# Patient Record
Sex: FEMALE | Race: OTHER
[De-identification: names, ages, dates, MRNs, and addresses within clinical notes are randomized per-mention and may not be internally consistent; named-entity substitution may affect disease eponyms.]

---

## 2017-04-20 ENCOUNTER — HOSPITAL ENCOUNTER (EMERGENCY)
Dept: HOSPITAL 61 - ER | Age: 27
Discharge: HOME | End: 2017-04-20
Payer: SELF-PAY

## 2017-04-20 VITALS — SYSTOLIC BLOOD PRESSURE: 118 MMHG | DIASTOLIC BLOOD PRESSURE: 66 MMHG

## 2017-04-20 DIAGNOSIS — R82.71: ICD-10-CM

## 2017-04-20 DIAGNOSIS — R10.30: ICD-10-CM

## 2017-04-20 DIAGNOSIS — Z3A.17: ICD-10-CM

## 2017-04-20 DIAGNOSIS — O26.892: Primary | ICD-10-CM

## 2017-04-20 DIAGNOSIS — O36.0920: ICD-10-CM

## 2017-04-20 LAB
ALBUMIN SERPL-MCNC: 3.2 G/DL (ref 3.4–5)
ALP SERPL-CCNC: 42 U/L (ref 46–116)
ALT SERPL-CCNC: 16 U/L (ref 14–59)
ANION GAP SERPL CALC-SCNC: 10 MMOL/L (ref 6–14)
ANISOCYTOSIS BLD QL SMEAR: SLIGHT
AST SERPL-CCNC: 15 U/L (ref 15–37)
BACTERIA #/AREA URNS HPF: (no result) /HPF
BASOPHILS # BLD AUTO: 0 X10^3/UL (ref 0–0.2)
BASOPHILS NFR BLD: 0 % (ref 0–3)
BILIRUB DIRECT SERPL-MCNC: 0.1 MG/DL (ref 0–0.2)
BILIRUB SERPL-MCNC: 0.2 MG/DL (ref 0.2–1)
BILIRUB UR QL STRIP: NEGATIVE
BUN SERPL-MCNC: 10 MG/DL (ref 7–20)
CALCIUM SERPL-MCNC: 8.8 MG/DL (ref 8.5–10.1)
CHLORIDE SERPL-SCNC: 102 MMOL/L (ref 98–107)
CO2 SERPL-SCNC: 26 MMOL/L (ref 21–32)
CREAT SERPL-MCNC: 0.6 MG/DL (ref 0.6–1)
EOSINOPHIL NFR BLD: 1 % (ref 0–3)
ERYTHROCYTE [DISTWIDTH] IN BLOOD BY AUTOMATED COUNT: 18 % (ref 11.5–14.5)
GFR SERPLBLD BASED ON 1.73 SQ M-ARVRAT: 121.8 ML/MIN
GLUCOSE SERPL-MCNC: 100 MG/DL (ref 70–99)
GLUCOSE UR STRIP-MCNC: NEGATIVE MG/DL
HCT VFR BLD CALC: 27.2 % (ref 36–47)
HGB BLD-MCNC: 8.8 G/DL (ref 12–15.5)
HYPOCHROMIA BLD QL SMEAR: SLIGHT
LYMPHOCYTES # BLD: 1.9 X10^3/UL (ref 1–4.8)
LYMPHOCYTES NFR BLD AUTO: 20 % (ref 24–48)
MCH RBC QN AUTO: 23 PG (ref 25–35)
MCHC RBC AUTO-ENTMCNC: 32 G/DL (ref 31–37)
MCV RBC AUTO: 72 FL (ref 79–100)
MICROCYTES BLD QL SMEAR: SLIGHT
MONOCYTES NFR BLD: 6 % (ref 0–9)
NEUTROPHILS NFR BLD AUTO: 73 % (ref 31–73)
NITRITE UR QL STRIP: NEGATIVE
PH UR STRIP: 7 [PH]
PLATELET # BLD AUTO: 191 X10^3/UL (ref 140–400)
PLATELET # BLD EST: ADEQUATE 10*3/UL
POIKILOCYTOSIS BLD QL SMEAR: SLIGHT
POTASSIUM SERPL-SCNC: 3.3 MMOL/L (ref 3.5–5.1)
PROT SERPL-MCNC: 7.4 G/DL (ref 6.4–8.2)
PROT UR STRIP-MCNC: NEGATIVE MG/DL
RBC # BLD AUTO: 3.8 X10^6/UL (ref 3.5–5.4)
RBC #/AREA URNS HPF: 0 /HPF (ref 0–2)
SODIUM SERPL-SCNC: 138 MMOL/L (ref 136–145)
SP GR UR STRIP: 1.01
SQUAMOUS #/AREA URNS LPF: (no result) /LPF
UROBILINOGEN UR-MCNC: 1 MG/DL
WBC # BLD AUTO: 9.3 X10^3/UL (ref 4–11)
WBC #/AREA URNS HPF: (no result) /HPF (ref 0–4)

## 2017-04-20 PROCEDURE — 80076 HEPATIC FUNCTION PANEL: CPT

## 2017-04-20 PROCEDURE — 76805 OB US >/= 14 WKS SNGL FETUS: CPT

## 2017-04-20 PROCEDURE — 36415 COLL VENOUS BLD VENIPUNCTURE: CPT

## 2017-04-20 PROCEDURE — 80048 BASIC METABOLIC PNL TOTAL CA: CPT

## 2017-04-20 PROCEDURE — 86850 RBC ANTIBODY SCREEN: CPT

## 2017-04-20 PROCEDURE — 85027 COMPLETE CBC AUTOMATED: CPT

## 2017-04-20 PROCEDURE — 81001 URINALYSIS AUTO W/SCOPE: CPT

## 2017-04-20 PROCEDURE — 86900 BLOOD TYPING SEROLOGIC ABO: CPT

## 2017-04-20 PROCEDURE — 86901 BLOOD TYPING SEROLOGIC RH(D): CPT

## 2017-04-20 PROCEDURE — 85007 BL SMEAR W/DIFF WBC COUNT: CPT

## 2017-04-20 PROCEDURE — 83690 ASSAY OF LIPASE: CPT

## 2017-04-20 NOTE — PHYS DOC
Past Medical History


Past Medical History:  No Pertinent History


Past Surgical History:  No Surgical History


Additional Past Surgical Histo:  OVARIAN TORSION


Alcohol Use:  None


Drug Use:  None





Adult General


Chief Complaint


Chief Complaint:  ABDOMINAL PAIN IN PREGNANCY





HPI


HPI


27-year-old female presenting to the emergency department today for concern for 

the fetus. She is a proximally 17 weeks pregnant by last menstrual period. She 

has had lower abdominal pain that is sharp moderate intermittent nonradiating. 

She was seen in clinic today with her unable to find the fetal heartbeat. She 

denies trauma. She denies vaginal bleeding.





Review of systems is negative for chest pain shortness of breath headache neck 

stiffness fevers or chills. All other review of systems is negative unless 

otherwise noted in history of present illness.





Review of Systems


Review of Systems


SEE ABOVE.





Allergies


Allergies





 Allergies








Coded Allergies Type Severity Reaction Last Updated Verified


 


  No Known Drug Allergies    1/10/16 No











Physical Exam


Physical Exam





Constitutional: Well developed, well nourished, no acute distress, non-toxic 

appearance. 


HENT: Normocephalic, atraumatic, bilateral external ears normal, oropharynx 

moist, no oral exudates, nose normal. []


Eyes: PERRLA, EOMI, conjunctiva normal, no discharge.  


Neck: Normal range of motion, no tenderness, supple, no stridor. [] 


Cardiovascular:Heart rate regular rhythm, no murmur 


Lungs & Thorax:  Bilateral breath sounds clear to auscultation []


Abdomen: Gravid abdomen that is nontender. No rebound tenderness or guarding is 

present. Negative McBurney's point when adjusted for gestation. 


Skin: Warm, dry, no erythema, no rash. [] 


Back: No tenderness, no CVA tenderness.  


Extremities: No tenderness, no cyanosis, no clubbing, ROM intact, no edema. [] 


Neurologic: Alert and oriented X 3, normal motor function, normal sensory 

function, no focal deficits noted. 


Psychologic: Affect normal, judgement normal, mood normal.





Current Patient Data


Vital Signs





 Vital Signs








  Date Time  Temp Pulse Resp B/P Pulse Ox O2 Delivery O2 Flow Rate FiO2


 


4/20/17 20:12 97.9 71 16 118/66 100 Room Air  





 97.9       








Lab Values





 Laboratory Tests








Test


  4/20/17


20:28 4/20/17


21:08


 


Urine Collection Type Unknown   


 


Urine Color Yellow   


 


Urine Clarity Clear   


 


Urine pH 7.0   


 


Urine Specific Gravity 1.015   


 


Urine Protein


  Negativemg/dL


(NEG-TRACE) 


 


 


Urine Glucose (UA)


  Negativemg/dL


(NEG) 


 


 


Urine Ketones (Stick)


  Negativemg/dL


(NEG) 


 


 


Urine Blood


  Negative (NEG)


  


 


 


Urine Nitrite


  Negative (NEG)


  


 


 


Urine Bilirubin


  Negative (NEG)


  


 


 


Urine Urobilinogen Dipstick


  1.0mg/dL (0.2


mg/dL) 


 


 


Urine Leukocyte Esterase


  Negative (NEG)


  


 


 


Urine RBC 0/HPF (0-2)   


 


Urine WBC Occ/HPF (0-4)   


 


Urine Squamous Epithelial


Cells Mod/LPF  


  


 


 


Urine Bacteria


  Few/HPF


(0-FEW) 


 


 


Urine Mucus Slight/LPF   


 


White Blood Count


  


  9.3x10^3/uL


(4.0-11.0)


 


Red Blood Count


  


  3.80x10^6/uL


(3.50-5.40)


 


Hemoglobin


  


  8.8g/dL


(12.0-15.5)  L


 


Hematocrit


  


  27.2%


(36.0-47.0)  L


 


Mean Corpuscular Volume


  


  72fL ()


L


 


Mean Corpuscular Hemoglobin  23pg (25-35)  L


 


Mean Corpuscular Hemoglobin


Concent 


  32g/dL (31-37)


 


 


Red Cell Distribution Width


  


  18.0%


(11.5-14.5)  H


 


Platelet Count


  


  191x10^3/uL


(140-400)


 


Neutrophils (%) (Auto)  73% (31-73)  


 


Lymphocytes (%) (Auto)  20% (24-48)  L


 


Monocytes (%) (Auto)  6% (0-9)  


 


Eosinophils (%) (Auto)  1% (0-3)  


 


Basophils (%) (Auto)  0% (0-3)  


 


Neutrophils # (Auto)


  


  6.8x10^3uL


(1.8-7.7)


 


Lymphocytes # (Auto)


  


  1.9x10^3/uL


(1.0-4.8)


 


Monocytes # (Auto)


  


  0.6x10^3/uL


(0.0-1.1)


 


Eosinophils # (Auto)


  


  0.0x10^3/uL


(0.0-0.7)


 


Basophils # (Auto)


  


  0.0x10^3/uL


(0.0-0.2)


 


Platelet Estimate  Pending  


 


Sodium Level


  


  138mmol/L


(136-145)


 


Potassium Level


  


  3.3mmol/L


(3.5-5.1)  L


 


Chloride Level


  


  102mmol/L


()


 


Carbon Dioxide Level


  


  26mmol/L


(21-32)


 


Anion Gap  10 (6-14)  


 


Blood Urea Nitrogen


  


  10mg/dL (7-20)


 


 


Creatinine


  


  0.6mg/dL


(0.6-1.0)


 


Estimated GFR


(Cockcroft-Gault) 


  121.8  


 


 


Glucose Level


  


  100mg/dL


(70-99)  H


 


Calcium Level


  


  8.8mg/dL


(8.5-10.1)


 


Total Bilirubin


  


  0.2mg/dL


(0.2-1.0)


 


Direct Bilirubin


  


  0.1mg/dL


(0.0-0.2)


 


Aspartate Amino Transferase


(AST) 


  15U/L (15-37)  


 


 


Alanine Aminotransferase (ALT)  16U/L (14-59)  


 


Alkaline Phosphatase


  


  42U/L ()


L


 


Total Protein


  


  7.4g/dL


(6.4-8.2)


 


Albumin


  


  3.2g/dL


(3.4-5.0)  L


 


Lipase


  


  112U/L


()





 Laboratory Tests


4/20/17 21:08








 Laboratory Tests


4/20/17 21:08











EKG


EKG


[]





Radiology/Procedures


Radiology/Procedures


[]





Course & Med Decision Making


Course & Med Decision Making


Pertinent Labs and Imaging studies reviewed. (See chart for details)





[] 27-year-old female presenting to the emergency department with abdominal 

pain and reported difficulty finding fetal heartbeat in clinic. Vital signs 

unremarkable. Pertinent physical exam findings showed a nontender abdomen. 

Ultrasound showed fetus with heart beat. No vaginal bleeding present. Patient 

was in discharged home to follow up with obstetrician over the next 2-3 days. 

Repeat ultrasound recommended within 2 weeks. pt is Rh neg. No need for rhogam 

to be given at this point because the patient does not have vaginal bleeding or 

recent trauma. She was given Keflex for asymptomatic bacteriuria in pregnancy.





Dragon Disclaimer


Dragon Disclaimer


This electronic medical record was generated, in whole or in part, using a 

voice recognition dictation system.





Departure


Departure


Impression:  


 Primary Impression:  


 Abdominal pain affecting pregnancy


 Additional Impressions:  


 Asymptomatic bacteriuria


 Rh negative status during pregnancy


Disposition:  01 HOME, SELF-CARE


Condition:  STABLE


Referrals:  


NO PCP (PCP)








MONICA BUSH Jr, MD


Patient Instructions:  Abdominal Pain During Pregnancy





Additional Instructions:


Thank you for allowing us to participate in your care today.





Followup with your OB in 3 days if your symptoms do not improve. If you do not 

have a primary care provider you can ask for a list of our primary care 

providers. Return to the emergency department you have any new or concerning 

findings.





This should be evaluated by the primary care physician and any necessary 

consulting services for continued management within a few days after discharge. 

Return to emergency room if you have any  new or concerning symptoms including 

but not limited to fever, chills, nausea, vomiting, intractable pain, any new 

rashes, chest pain, shortness of air, uncontrolled bleeding, difficulty 

breathing, and/or vision loss.





You may have been prescribed medication that can change in your level of 

thinking and ability to operate machinery. These medications include 

hydrocodone and Ativan. Also, Benadryl has been known to do this as well. Be 

sure to check with your pharmacist and ask if the medications you've prescribed 

can affect your level of consciousness. I recommend not operating heavy 

machinery or driving while on medication such as these.


Scripts


Cephalexin (Keflex)500 Mg Capsule1 Cap PO BID #14 CAP


   Prov:HAIDER DEVINE MD         4/20/17


Prenatal Vits W-Ca,Fe,Fa(<1MG) (Prenatal Vitamins)1 Each Tablet1 Tab PO DAILY #

15 TAB


   Prov:HAIDER DEVINE MD         4/20/17





Problem Qualifiers








HAIDER DEVINE MD Apr 20, 2017 20:53

## 2017-04-20 NOTE — RAD
PROCEDURE 

Obstetrics sonogram. 

 

HISTORY 

Pain. 

 

TECHNIQUE 

Sonographic imaging of a gravid uterus was performed. 

 

COMPARISON 

None. 

 

FINDINGS 

There is a single intrauterine fetus with a heart rate of 141 beats per 

minute. The fetus is in cephalic presentation. The cervix measures 5.4 cm 

in length. There is a low lying posterior placenta extending to 1.9 cm 

from the internal cervical os. The amniotic fluid index is normal. There 

is a three-vessel umbilical cord. Fetal body motion is seen. The fetal 

bladder, stomach, kidneys, spine and brain are unremarkable. There is a 

four-chamber fetal heart. 

 

The biparietal diameter is 3.67 cm, corresponding with 17 weeks and 2 

days. The head circumference is 13.45 cm, corresponding with 17 weeks and 

0 days. The abdominal circumference is 11.39 cm, corresponding with 17 

weeks and 1 day. The femoral length is 2.48 cm, corresponding with 17 

weeks and 3 days. The head circumference to abdominal circumference ratio 

is 1.18. The estimated fetal weight is 189 grams in the estimated 

gestational age based on combined ultrasound measurements is 17 weeks and 

2 days. 

 

There prominent right maternal adnexal vessels, not clearly within limits 

to suggest congestion. The placental insertion of the umbilical cord is 

less than 1 centimeter from the placental margin. The cephalic index is 

slightly elevated 85.1. There is a small hypoechoic region within the 

fundus of the uterus possibly due to a small fibroid. 

 

IMPRESSION 

1. Single intrauterine fetus with an estimated gestational age based on 

ultrasound measurements of 17 weeks and 2 days and heart rate of 141 beats

per minute. Fetal anatomy was assessed on this exam. However, a formal 

fetal anatomy survey can be performed at approximately 20 weeks gestation.

 

2. Suspected marginal umbilical cord insertion extending to less than 1 

centimeter from the placental margin. Short-term follow-up is recommended.

 

3. Prominent right maternal adnexal vessels, not clearly within limits to 

suggest congestion. 

 

Electronically signed by: Debra Iyer (Apr 20, 2017 21:50:50)

## 2021-08-29 ENCOUNTER — HOSPITAL ENCOUNTER (EMERGENCY)
Dept: HOSPITAL 61 - ER | Age: 31
Discharge: HOME | End: 2021-08-29
Payer: SELF-PAY

## 2021-08-29 VITALS — SYSTOLIC BLOOD PRESSURE: 116 MMHG | DIASTOLIC BLOOD PRESSURE: 70 MMHG

## 2021-08-29 VITALS — HEIGHT: 65 IN | WEIGHT: 141.1 LBS | BODY MASS INDEX: 23.51 KG/M2

## 2021-08-29 DIAGNOSIS — U07.1: Primary | ICD-10-CM

## 2021-08-29 LAB
ALBUMIN SERPL-MCNC: 4.3 G/DL (ref 3.4–5)
ALBUMIN/GLOB SERPL: 1.2 {RATIO} (ref 1–1.7)
ALP SERPL-CCNC: 57 U/L (ref 46–116)
ALT SERPL-CCNC: 16 U/L (ref 14–59)
ANION GAP SERPL CALC-SCNC: 9 MMOL/L (ref 6–14)
ANISOCYTOSIS BLD QL SMEAR: SLIGHT
APTT PPP: YELLOW S
AST SERPL-CCNC: 18 U/L (ref 15–37)
BACTERIA #/AREA URNS HPF: (no result) /HPF
BASOPHILS # BLD AUTO: 0 X10^3/UL (ref 0–0.2)
BASOPHILS NFR BLD: 1 % (ref 0–3)
BILIRUB SERPL-MCNC: 0.2 MG/DL (ref 0.2–1)
BILIRUB UR QL STRIP: NEGATIVE
BUN SERPL-MCNC: 13 MG/DL (ref 7–20)
BUN/CREAT SERPL: 16 (ref 6–20)
CALCIUM SERPL-MCNC: 9.1 MG/DL (ref 8.5–10.1)
CHLORIDE SERPL-SCNC: 103 MMOL/L (ref 98–107)
CO2 SERPL-SCNC: 29 MMOL/L (ref 21–32)
CREAT SERPL-MCNC: 0.8 MG/DL (ref 0.6–1)
EOSINOPHIL NFR BLD: 0.1 X10^3/UL (ref 0–0.7)
EOSINOPHIL NFR BLD: 2 % (ref 0–3)
ERYTHROCYTE [DISTWIDTH] IN BLOOD BY AUTOMATED COUNT: 18.2 % (ref 11.5–14.5)
FIBRINOGEN PPP-MCNC: CLEAR MG/DL
GFR SERPLBLD BASED ON 1.73 SQ M-ARVRAT: 84.8 ML/MIN
GLUCOSE SERPL-MCNC: 81 MG/DL (ref 70–99)
HCT VFR BLD CALC: 30 % (ref 36–47)
HGB BLD-MCNC: 9.4 G/DL (ref 12–15.5)
INFLUENZA A PATIENT: NEGATIVE
INFLUENZA B PATIENT: NEGATIVE
LYMPHOCYTES # BLD: 1.7 X10^3/UL (ref 1–4.8)
LYMPHOCYTES NFR BLD AUTO: 43 % (ref 24–48)
MCH RBC QN AUTO: 22 PG (ref 25–35)
MCHC RBC AUTO-ENTMCNC: 31 G/DL (ref 31–37)
MCV RBC AUTO: 70 FL (ref 79–100)
MICROCYTES BLD QL SMEAR: (no result)
MONO #: 0.3 X10^3/UL (ref 0–1.1)
MONOCYTES NFR BLD: 7 % (ref 0–9)
MONONUCLEOSIS PATIENT: NEGATIVE
NEUT #: 1.9 X10^3/UL (ref 1.8–7.7)
NEUTROPHILS NFR BLD AUTO: 47 % (ref 31–73)
NITRITE UR QL STRIP: NEGATIVE
PH UR STRIP: 6.5 [PH]
PLATELET # BLD AUTO: 281 X10^3/UL (ref 140–400)
PLATELET # BLD EST: ADEQUATE 10*3/UL
POLYCHROMASIA BLD QL SMEAR: SLIGHT
POTASSIUM SERPL-SCNC: 3.7 MMOL/L (ref 3.5–5.1)
PROT SERPL-MCNC: 7.9 G/DL (ref 6.4–8.2)
PROT UR STRIP-MCNC: NEGATIVE MG/DL
RBC # BLD AUTO: 4.28 X10^6/UL (ref 3.5–5.4)
RBC #/AREA URNS HPF: 0 /HPF (ref 0–2)
SODIUM SERPL-SCNC: 141 MMOL/L (ref 136–145)
UROBILINOGEN UR-MCNC: 0.2 MG/DL
WBC # BLD AUTO: 4 X10^3/UL (ref 4–11)
WBC #/AREA URNS HPF: (no result) /HPF (ref 0–4)

## 2021-08-29 PROCEDURE — 80053 COMPREHEN METABOLIC PANEL: CPT

## 2021-08-29 PROCEDURE — 99284 EMERGENCY DEPT VISIT MOD MDM: CPT

## 2021-08-29 PROCEDURE — 81025 URINE PREGNANCY TEST: CPT

## 2021-08-29 PROCEDURE — 87426 SARSCOV CORONAVIRUS AG IA: CPT

## 2021-08-29 PROCEDURE — 36415 COLL VENOUS BLD VENIPUNCTURE: CPT

## 2021-08-29 PROCEDURE — 81001 URINALYSIS AUTO W/SCOPE: CPT

## 2021-08-29 PROCEDURE — 87804 INFLUENZA ASSAY W/OPTIC: CPT

## 2021-08-29 PROCEDURE — 84443 ASSAY THYROID STIM HORMONE: CPT

## 2021-08-29 PROCEDURE — 86308 HETEROPHILE ANTIBODY SCREEN: CPT

## 2021-08-29 PROCEDURE — 85025 COMPLETE CBC W/AUTO DIFF WBC: CPT

## 2021-08-29 PROCEDURE — 71045 X-RAY EXAM CHEST 1 VIEW: CPT

## 2021-08-29 NOTE — ED.ADGEN
Past Medical History


Past Medical History:  No Pertinent History


Past Surgical History:  No Surgical History


Additional Past Surgical Histo:  OVARIAN TORSION


Smoking Status:  Never Smoker


Alcohol Use:  None


Drug Use:  None





General Adult


EDM:


Chief Complaint:  FLU SYMPTOM





HPI:


HPI:





Patient is a 31  year old female coming in with multiple complaints.  Patient 

states she has bilateral posterior neck pain but no C-spine tenderness.  No neck

rigidity or sore throat.  Also complaining of bilateral low back pain that she 

describes as cramping.  Denies any dysuria or hematuria.  Denies any fevers but 

has had chills.  Has had a headache and nonproductive cough.  Patient states she

got her second Covid vaccine 1 month ago.  No known sick contacts.  Works as a 

 and denies any heavy lifting or falls.  Patient states she was told that

she needs to see an oncologist about her thyroid but has not followed up, it was

several years ago.  States she thinks her thyroid is overactive but is unsure of

what the exact problem list.





Review of Systems:


Review of Systems:


All other systems within normal limits except for as noted in the HPI





Allergies:


Allergies:





Allergies








Coded Allergies Type Severity Reaction Last Updated Verified


 


  No Known Drug Allergies    1/10/16 No











Physical Exam:


PE:


Constitutional: Well developed, well nourished, no acute distress, non-toxic 

appearance. []


HENT: Normocephalic, atraumatic, bilateral external ears normal,  nose normal.  

Normal oropharynx []


Eyes: PERRLA, conjunctiva normal, no discharge. [] 


Neck: No rigidity, supple, no stridor.  No C-spine tenderness, tenderness on 

bilateral paraspinous muscles.  No lymphadenopathy felt but tender over 

posterior cervical chain [] 


Cardiovascular: Regular rate and rhythm, brisk cap refill []


Lungs & Thorax: Non labored symmetric respirations, no tachypnea or respiratory 

distress []


Abdomen: Soft, nondistended.


Skin: Warm, dry, no erythema, no rash. [] 


Back: Unremarkable, bilateral lumbar tenderness, no tenderness on the spine, no 

step-off or deformity


Extremities: No deformities, range of motion grossly intact, no lower extremity 

edema [] 


Neurologic: Alert and oriented X 3, no focal deficits noted. []


Psychologic: Affect normal, judgement normal, mood normal. []





Current Patient Data:


Labs:





                                Laboratory Tests








Test


 21


20:30 21


20:44 21


21:15


 


Urine Collection Type Unknown    


 


Urine Color Yellow    


 


Urine Clarity Clear    


 


Urine pH


 6.5 (<5.0-8.0)


 


 





 


Urine Specific Gravity


 1.020


(1.000-1.030) 


 





 


Urine Protein


 Negative mg/dL


(NEG-TRACE) 


 





 


Urine Glucose (UA)


 Negative mg/dL


(NEG) 


 





 


Urine Ketones (Stick)


 Negative mg/dL


(NEG) 


 





 


Urine Blood


 Negative (NEG)


 


 





 


Urine Nitrite


 Negative (NEG)


 


 





 


Urine Bilirubin


 Negative (NEG)


 


 





 


Urine Urobilinogen Dipstick


 0.2 mg/dL (0.2


mg/dL) 


 





 


Urine Leukocyte Esterase


 Negative (NEG)


 


 





 


Urine RBC 0 /HPF (0-2)    


 


Urine WBC


 Rare /HPF


(0-4) 


 





 


Urine Squamous Epithelial


Cells Many /LPF  


 


 





 


Urine Bacteria


 Few /HPF


(0-FEW) 


 





 


Urine Mucus Marked /LPF    


 


POC Urine HCG, Qualitative


 


 Hcg negative


(Negative) 





 


White Blood Count


 


 


 4.0 x10^3/uL


(4.0-11.0)


 


Red Blood Count


 


 


 4.28 x10^6/uL


(3.50-5.40)


 


Hemoglobin


 


 


 9.4 g/dL


(12.0-15.5)  L


 


Hematocrit


 


 


 30.0 %


(36.0-47.0)  L


 


Mean Corpuscular Volume


 


 


 70 fL ()


L


 


Mean Corpuscular Hemoglobin


 


 


 22 pg (25-35)


L


 


Mean Corpuscular Hemoglobin


Concent 


 


 31 g/dL


(31-37)


 


Red Cell Distribution Width


 


 


 18.2 %


(11.5-14.5)  H


 


Platelet Count


 


 


 281 x10^3/uL


(140-400)


 


Neutrophils (%) (Auto)   47 % (31-73)  


 


Lymphocytes (%) (Auto)   43 % (24-48)  


 


Monocytes (%) (Auto)   7 % (0-9)  


 


Eosinophils (%) (Auto)   2 % (0-3)  


 


Basophils (%) (Auto)   1 % (0-3)  


 


Neutrophils # (Auto)


 


 


 1.9 x10^3/uL


(1.8-7.7)


 


Lymphocytes # (Auto)


 


 


 1.7 x10^3/uL


(1.0-4.8)


 


Monocytes # (Auto)


 


 


 0.3 x10^3/uL


(0.0-1.1)


 


Eosinophils # (Auto)


 


 


 0.1 x10^3/uL


(0.0-0.7)


 


Basophils # (Auto)


 


 


 0.0 x10^3/uL


(0.0-0.2)


 


Platelet Estimate


 


 


 Adequate


(ADEQUATE)


 


Polychromasia   Slight  


 


Anisocytosis   Slight  


 


Microcytosis   Mod  


 


Sodium Level


 


 


 141 mmol/L


(136-145)


 


Potassium Level


 


 


 3.7 mmol/L


(3.5-5.1)


 


Chloride Level


 


 


 103 mmol/L


()


 


Carbon Dioxide Level


 


 


 29 mmol/L


(21-32)


 


Anion Gap   9 (6-14)  


 


Blood Urea Nitrogen


 


 


 13 mg/dL


(7-20)


 


Creatinine


 


 


 0.8 mg/dL


(0.6-1.0)


 


Estimated GFR


(Cockcroft-Gault) 


 


 84.8  





 


BUN/Creatinine Ratio   16 (6-20)  


 


Glucose Level


 


 


 81 mg/dL


(70-99)


 


Calcium Level


 


 


 9.1 mg/dL


(8.5-10.1)


 


Total Bilirubin


 


 


 0.2 mg/dL


(0.2-1.0)


 


Aspartate Amino Transferase


(AST) 


 


 18 U/L (15-37)





 


Alanine Aminotransferase (ALT)


 


 


 16 U/L (14-59)





 


Alkaline Phosphatase


 


 


 57 U/L


()


 


Total Protein


 


 


 7.9 g/dL


(6.4-8.2)


 


Albumin


 


 


 4.3 g/dL


(3.4-5.0)


 


Albumin/Globulin Ratio   1.2 (1.0-1.7)  


 


Heterophil Agglutinins


 


 


 Negative


(NEGATIVE)


 


Influenza Type A Antigen


 


 


 Negative


(NEGATIVE)


 


Influenza Type B Antigen


 


 


 Negative


(NEGATIVE)


 


SARS-CoV-2 Antigen (Rapid)


 


 


 Positive


(NEGATIVE)  *A





                                Laboratory Tests


21 21:15








                                Laboratory Tests


21 21:15








Vital Signs:





                                   Vital Signs








  Date Time  Temp Pulse Resp B/P (MAP) Pulse Ox O2 Delivery O2 Flow Rate FiO2


 


21 20:34 97.8 68 24 125/79 (83) 100 Room Air  





 97.8       











EKG:


EKG:


[]





Heart Score:


C/O Chest Pain:  No


Risk Factors:


Risk Factors:  DM, Current or recent (<one month) smoker, HTN, HLP, family 

history of CAD, obesity.


Risk Scores:


Score 0 - 3:  2.5% MACE over next 6 weeks - Discharge Home


Score 4 - 6:  20.3% MACE over next 6 weeks - Admit for Clinical Observation


Score 7 - 10:  72.7% MACE over next 6 weeks - Early Invasive Strategies





Radiology/Procedures:


Radiology/Procedures:


[facility]


                    [address] [place] [address]


                                 [phone]


                                        


                                 IMAGING REPORT





                                     Signed





PATIENT: JOSE LAWSON  ACCOUNT: [id number]     MRN#: [id number]


: 1990           LOCATION: ER              AGE: 31


SEX: F                    EXAM DT: 21         ACCESSION#: [id number]


STATUS: REG ER            ORD. PHYSICIAN: ANTHONY MCLEAN MD


REASON: cough


PROCEDURE: PORTABLE CHEST 1V





Single view chest dated 2021 9:10 PM:





COMPARISON: None





Clinical Indication: Cough.





Findings:





Single upright portable exam of the chest was performed. Heart size and 

mediastinal contours are within normal limits. Lungs are clear. No consolidation

 or pleural effusion. No pneumothorax.





IMPRESSION:





No acute radiographic abnormality.





Electronically signed by: Ric Johnson MD (2021 9:10 PM) [facility] [place]














DICTATED and SIGNED BY:     RIC JOHNSON MD


DATE:     210MTH0 0


[]





Course & Med Decision Making:


Course & Med Decision Making


Pertinent Labs and Imaging studies reviewed. (See chart for details)





[]





Dragon Disclaimer:


Dragon Disclaimer:


This electronic medical record was generated, in whole or in part, using a voice

 recognition dictation system.





Departure


Departure


Impression:  


   Primary Impression:  


   COVID-19


Disposition:   HOME / SELF CARE / HOMELESS


Condition:  STABLE


Referrals:  


NO PCP (PCP)





Additional Instructions:  


You have been tested for or diagnosed with COVID-19. It is an infection caused 

by a new type 


of coronavirus. COVID-19 will cause cold-like or mild flu symptoms in most. It 

can cause 


more severe symptoms like problems breathing in some.





There is no treatment for COVID-19. The body will clear the infection over time.

 Self-care 


will help to ease discomfort.





Steps to Take:


Self-Care


Rest as needed. Healthy habits may help you feel better. Steps include:





Choose healthy foods including fruits and vegetables. Drink water throughout the

 day.


Get plenty of sleep each night.


If you smoke, try to quit. It may ease breathing.


Avoid alcohol.


Keep Others Healthy


The virus can spread to others. Droplets are released every time you sneeze or 

cough. The 


droplets can get into the mouth, nose, or eyes of people near you and lead to 

infection. To 


lower the chances of spreading COVID-19 to others:





Stay at home until your doctor has said it is safe to leave. If you tested 

positive this 


will mean staying isolated until both of the following are true:





At least 7 days have passed since the start of illness.


You are free of fever for at least 72 hours without the use of medicine.


During this time:





 - Avoid public areas, events, or transportation. Do not return to work or 

school until your 


doctor has said it is safe to do so.


 - Call ahead if you need to go to a medical center. Let them know you may have 

COVID-19. It 


will help them guide you where to go. They may also ask you to wear a facemask 

when you come 


to the office.


 - If you call for emergency medical services, let them know you may have COVID-

19.


While at home:





 - Try to avoid close contact with others. Stay about 6 feet away.


 - If possible, spend most of your time in a separate room from others.


 - Use a face mask if you will be in close contact with others such as sharing a

 room or 


vehicle.


 - Have someone wipe down common surfaces in the home. Use household  

every day on 


areas like doorknobs, counters, or sinks.


 - Cough or sneeze into a tissue. Throw the tissue away right after use. If a t

issue is not 


available, cough or sneeze into your elbow.


 - Wash your hands often. Wash them after sneezing or coughing. Use soap and 

water and wash 


for at least 20 seconds. Alcohol based hand  can be used if soap and 

water is not 


available.


 - Do not prepare food for others. Avoid sharing personal items like forks, 

spoons, or 


toothbrushes.


 - Avoid close contact with pets while you are sick. There is no evidence of the

 virus 


passing to pets. This is a safety step until more is known about this virus.


Isolation can be frustrating. Social interaction can help. Keep in touch with 

friends and 


family through phone and tech options. You can still interact with others in 

your home, just 


keep a safe distance of about 6 feet.





Follow-up:


Your doctors office will check in with you to see if there are any changes in 

your health. 


You may be asked to keep track of symptoms to share with them. They will also 

let you know 


when you are clear to be in public again.





Problems to Look Out For:


Contact your doctor if your recovery is not going as you expect. Get emergency 

care if you 


have problems such as:





 - Trouble breathing


 - Nonstop chest pain or pressure


 - Changes in awareness, confusion, or problems waking


 - Lips or face have bluish color


 - Worsening of symptoms


If you think you have an emergency, call for emergency medical services right 

away.





As taken from [facility]THONY LOTT MD            Aug 29, 2021 20:51

## 2022-05-15 ENCOUNTER — HOSPITAL ENCOUNTER (EMERGENCY)
Dept: HOSPITAL 61 - ER | Age: 32
Discharge: HOME | End: 2022-05-15
Payer: SELF-PAY

## 2022-05-15 VITALS — WEIGHT: 147.27 LBS | BODY MASS INDEX: 24.54 KG/M2 | HEIGHT: 65 IN

## 2022-05-15 VITALS — DIASTOLIC BLOOD PRESSURE: 61 MMHG | SYSTOLIC BLOOD PRESSURE: 100 MMHG

## 2022-05-15 DIAGNOSIS — Z20.822: ICD-10-CM

## 2022-05-15 DIAGNOSIS — B34.9: Primary | ICD-10-CM

## 2022-05-15 LAB
INFLUENZA A PATIENT: NEGATIVE
INFLUENZA B PATIENT: NEGATIVE

## 2022-05-15 PROCEDURE — 87428 SARSCOV & INF VIR A&B AG IA: CPT

## 2022-05-15 PROCEDURE — 99283 EMERGENCY DEPT VISIT LOW MDM: CPT

## 2022-05-15 NOTE — PHYS DOC
Past Medical History


Past Medical History:  No Pertinent History


Past Surgical History:  Tubal ligation


Additional Past Surgical Histo:  OVARIAN TORSION


Smoking Status:  Never Smoker


Alcohol Use:  None


Drug Use:  None





General Adult


EDM:


Chief Complaint:  FEVER





HPI:


HPI:





Patient is a 32  year old F who presents with 2 days of fevers, chills, body 

aches and nausea with vomiting x1.  Patient has been taking Tylenol and Motrin 

as needed for fevers and pains.  Patient had her full COVID vaccination and was 

also COVID-positive 1 time last year.  Patient denies any known sick contacts in

the past weeks.  Patient denies any significant shortness of breath.





Review of Systems:


Review of Systems:


Constitutional:   fever and chills. []


Eyes:   Denies change in visual acuity. []


HENT:   Nasal congestion and sore throat. [] 


Respiratory:   Mild cough and shortness of breath. [] 


Cardiovascular:   Denies chest pain or edema. [] 


GI:   Denies abdominal pain or bloody stools [] 


:  Denies dysuria. [] 


Musculoskeletal:   Has moderate back pain and joint pain. [] 


Integument:   Denies rash. [] 


Neurologic:   Mild headache but nofocal weakness or sensory changes. [] 


Endocrine:   Denies polyuria or polydipsia. [] 


Lymphatic:  Denies swollen glands. [] 


Psychiatric:  Denies depression or anxiety. []





Heart Score:


C/O Chest Pain:  No


Risk Factors:


Risk Factors:  DM, Current or recent (<one month) smoker, HTN, HLP, family 

history of CAD, obesity.


Risk Scores:


Score 0 - 3:  2.5% MACE over next 6 weeks - Discharge Home


Score 4 - 6:  20.3% MACE over next 6 weeks - Admit for Clinical Observation


Score 7 - 10:  72.7% MACE over next 6 weeks - Early Invasive Strategies





Allergies:


Allergies:





Allergies








Coded Allergies Type Severity Reaction Last Updated Verified


 


  No Known Drug Allergies    1/10/16 No











Physical Exam:


PE:





Constitutional: Well developed, well nourished, no acute distress, non-toxic 

appearance. []


HENT: Normocephalic, atraumatic, bilateral external ears normal, oropharynx 

moist, no oral exudates, nose normal. []


Eyes: PERRLA, EOMI, conjunctiva normal, no discharge. [] 


Neck: Normal range of motion, no tenderness, supple, no stridor. [] 


Cardiovascular:Heart rate regular rhythm, no murmur []


Lungs & Thorax:  Bilateral breath sounds clear to auscultation []


Abdomen: Bowel sounds normal, soft, no tenderness, no masses, no pulsatile 

masses. [] 


Skin: Warm, dry, no erythema, no rash. [] 


Back: No tenderness, no CVA tenderness. [] 


Extremities: No tenderness, no cyanosis, no clubbing, ROM intact, no edema. [] 


Neurologic: Alert and oriented X 3, normal motor function, normal sensory 

function, no focal deficits noted. []


Psychologic: Affect normal, judgement normal, mood normal. []





Current Patient Data:


Vital Signs:





                                   Vital Signs








  Date Time  Temp Pulse Resp B/P (MAP) Pulse Ox O2 Delivery O2 Flow Rate FiO2


 


5/15/22 21:10 98.6 85 16 108/65 (79) 97 Room Air  





 98.6       











EKG:


EKG:


[]





Radiology/Procedures:


Radiology/Procedures:


[]





Course & Med Decision Making:


Course & Med Decision Making


Patient with a likely viral syndrome, possible flu versus COVID versus 

nonspecific virus.  Patient's oxygen saturation 100% on room air and clear lungs

 bilaterally, low suspicion for bacterial pneumonia.  Will swab for flu and 

COVID.  Patient refusing any IV fluids or medications, says that she currently 

does not feel particularly bad and would prefer to take p.o. medications when 

she gets home.  Pending flu and COVID swabs will likely discharge with 

prescription for Zofran to ensure she is able to hydrate well p.o. and will 

continue to take over-the-counter pain and fever medications.





Dragon Disclaimer:


Dragon Disclaimer:


This electronic medical record was generated, in whole or in part, using a voice

 recognition dictation system.





Departure


Departure


Impression:  


   Primary Impression:  


   Viral syndrome


Disposition:  01 HOME / SELF CARE / HOMELESS


Condition:  GOOD


Referrals:  


NO PCP (PCP)


Patient Instructions:  Viral Syndrome


Scripts


Ondansetron (ONDANSETRON ODT) 4 Mg Tab.rapdis


1 TAB PO PRN Q6-8HRS, #16 TAB


   Prov: JEET GA MD         5/15/22











JEET GA MD                  May 15, 2022 22:06
